# Patient Record
Sex: FEMALE
[De-identification: names, ages, dates, MRNs, and addresses within clinical notes are randomized per-mention and may not be internally consistent; named-entity substitution may affect disease eponyms.]

---

## 2018-04-06 ENCOUNTER — HOSPITAL ENCOUNTER (OUTPATIENT)
Dept: HOSPITAL 5 - XRAY | Age: 33
Discharge: HOME | End: 2018-04-06
Attending: SURGERY
Payer: COMMERCIAL

## 2018-04-06 DIAGNOSIS — M43.27: ICD-10-CM

## 2018-04-06 DIAGNOSIS — I87.2: Primary | ICD-10-CM

## 2018-04-06 PROCEDURE — 72110 X-RAY EXAM L-2 SPINE 4/>VWS: CPT

## 2018-04-06 NOTE — XRAY REPORT
FINAL REPORT



EXAM:  XR SPINE LUMBOSACRAL 4+V



HISTORY:  CHRONIC PERIPHERAL VENOUS INSUFFICIENCY 



TECHNIQUE:  AP, lateral, bilateral oblique, and coned-down views

of the lumbar spine



PRIORS:  None.



FINDINGS:  

The vertebral body heights and disc spaces are well maintained. 

The alignment is normal. No evidence for spondylolysis or

spondylolisthesis is seen. Pedicles are intact bilaterally at all

levels. The paraspinal soft tissues are unremarkable.



IMPRESSION:  

Normal lumbar spine. Adhesions

## 2018-04-19 ENCOUNTER — HOSPITAL ENCOUNTER (OUTPATIENT)
Dept: HOSPITAL 5 - US | Age: 33
Discharge: HOME | End: 2018-04-19
Attending: SURGERY
Payer: COMMERCIAL

## 2018-04-19 DIAGNOSIS — I87.2: Primary | ICD-10-CM

## 2018-04-19 PROCEDURE — 76830 TRANSVAGINAL US NON-OB: CPT

## 2018-04-19 PROCEDURE — 76856 US EXAM PELVIC COMPLETE: CPT

## 2018-04-19 NOTE — ULTRASOUND REPORT
ULTRASOUND PELVIC COMPLETE

ULTRASOUND TRANSVAGINAL



HISTORY: Venous insufficiency.



COMPARISON:  None.



TECHNIQUE: Transabdominal and transvaginal ultrasound with color 

doppler interrogation.



FINDINGS:



The uterus and ovaries are normal size, contour and echotexture. No 

evidence for uterine fibroid disease, pelvic cyst or mass. The 

endometrium measures 3 mm.



No pelvic fluid or mass is identified. Normal color doppler 

interrogation.







IMPRESSION:

Unremarkable transabdominal and transvaginal pelvic ultrasounds.